# Patient Record
Sex: MALE | Race: WHITE | HISPANIC OR LATINO | Employment: UNEMPLOYED | URBAN - METROPOLITAN AREA
[De-identification: names, ages, dates, MRNs, and addresses within clinical notes are randomized per-mention and may not be internally consistent; named-entity substitution may affect disease eponyms.]

---

## 2019-05-23 ENCOUNTER — TELEPHONE (OUTPATIENT)
Dept: NEUROLOGY | Facility: CLINIC | Age: 69
End: 2019-05-23

## 2022-05-05 ENCOUNTER — OFFICE VISIT (OUTPATIENT)
Dept: UROLOGY | Facility: CLINIC | Age: 72
End: 2022-05-05
Payer: MEDICARE

## 2022-05-05 VITALS
WEIGHT: 192.6 LBS | HEIGHT: 69 IN | SYSTOLIC BLOOD PRESSURE: 118 MMHG | DIASTOLIC BLOOD PRESSURE: 68 MMHG | HEART RATE: 66 BPM | OXYGEN SATURATION: 100 % | BODY MASS INDEX: 28.53 KG/M2

## 2022-05-05 DIAGNOSIS — N40.0 BENIGN PROSTATIC HYPERPLASIA, UNSPECIFIED WHETHER LOWER URINARY TRACT SYMPTOMS PRESENT: Primary | ICD-10-CM

## 2022-05-05 DIAGNOSIS — R97.20 ELEVATED PSA: ICD-10-CM

## 2022-05-05 LAB
POST-VOID RESIDUAL VOLUME, ML POC: 37 ML
SL AMB  POCT GLUCOSE, UA: NORMAL
SL AMB LEUKOCYTE ESTERASE,UA: NORMAL
SL AMB POCT BILIRUBIN,UA: NORMAL
SL AMB POCT BLOOD,UA: NORMAL
SL AMB POCT CLARITY,UA: CLEAR
SL AMB POCT COLOR,UA: YELLOW
SL AMB POCT KETONES,UA: NORMAL
SL AMB POCT NITRITE,UA: NORMAL
SL AMB POCT PH,UA: 6
SL AMB POCT SPECIFIC GRAVITY,UA: 1.02
SL AMB POCT URINE PROTEIN: NORMAL
SL AMB POCT UROBILINOGEN: 0.2

## 2022-05-05 PROCEDURE — 81002 URINALYSIS NONAUTO W/O SCOPE: CPT | Performed by: PHYSICIAN ASSISTANT

## 2022-05-05 PROCEDURE — 99203 OFFICE O/P NEW LOW 30 MIN: CPT | Performed by: PHYSICIAN ASSISTANT

## 2022-05-05 PROCEDURE — 51798 US URINE CAPACITY MEASURE: CPT | Performed by: PHYSICIAN ASSISTANT

## 2022-05-05 RX ORDER — TAMSULOSIN HYDROCHLORIDE 0.4 MG/1
CAPSULE ORAL
COMMUNITY
Start: 2022-04-19 | End: 2022-06-09 | Stop reason: SDUPTHER

## 2022-05-05 RX ORDER — PANTOPRAZOLE SODIUM 40 MG/1
40 TABLET, DELAYED RELEASE ORAL 2 TIMES DAILY
COMMUNITY
Start: 2022-04-19 | End: 2022-05-19 | Stop reason: SDUPTHER

## 2022-05-05 RX ORDER — ALBUTEROL SULFATE 90 UG/1
AEROSOL, METERED RESPIRATORY (INHALATION)
COMMUNITY
Start: 2022-05-04

## 2022-05-05 RX ORDER — ATORVASTATIN CALCIUM 20 MG/1
TABLET, FILM COATED ORAL
COMMUNITY
Start: 2022-05-04

## 2022-05-05 RX ORDER — PHENYTOIN SODIUM 100 MG/1
300 CAPSULE, EXTENDED RELEASE ORAL 2 TIMES DAILY
COMMUNITY
Start: 2022-04-25

## 2022-05-05 RX ORDER — PHENOBARBITAL 32.4 MG/1
64.8 TABLET ORAL 2 TIMES DAILY
COMMUNITY
Start: 2022-04-22 | End: 2022-10-19

## 2022-05-05 RX ORDER — FERROUS SULFATE 325(65) MG
1 TABLET ORAL
COMMUNITY
Start: 2022-04-19

## 2022-05-05 RX ORDER — CLOPIDOGREL BISULFATE 75 MG/1
75 TABLET ORAL DAILY
COMMUNITY
Start: 2022-02-24

## 2022-05-05 RX ORDER — DILTIAZEM HYDROCHLORIDE 60 MG/1
2 TABLET, FILM COATED ORAL 2 TIMES DAILY
COMMUNITY
Start: 2022-03-18

## 2022-05-19 ENCOUNTER — TELEPHONE (OUTPATIENT)
Dept: UROLOGY | Facility: CLINIC | Age: 72
End: 2022-05-19

## 2022-05-19 ENCOUNTER — PATIENT MESSAGE (OUTPATIENT)
Dept: GASTROENTEROLOGY | Facility: CLINIC | Age: 72
End: 2022-05-19

## 2022-05-19 ENCOUNTER — OFFICE VISIT (OUTPATIENT)
Dept: GASTROENTEROLOGY | Facility: CLINIC | Age: 72
End: 2022-05-19
Payer: MEDICARE

## 2022-05-19 ENCOUNTER — TELEPHONE (OUTPATIENT)
Dept: GASTROENTEROLOGY | Facility: CLINIC | Age: 72
End: 2022-05-19

## 2022-05-19 ENCOUNTER — APPOINTMENT (OUTPATIENT)
Dept: LAB | Facility: CLINIC | Age: 72
End: 2022-05-19
Payer: MEDICARE

## 2022-05-19 VITALS
SYSTOLIC BLOOD PRESSURE: 120 MMHG | BODY MASS INDEX: 27.99 KG/M2 | HEART RATE: 76 BPM | OXYGEN SATURATION: 98 % | WEIGHT: 189 LBS | DIASTOLIC BLOOD PRESSURE: 60 MMHG | HEIGHT: 69 IN

## 2022-05-19 DIAGNOSIS — A02.0 SALMONELLA GASTROENTERITIS: ICD-10-CM

## 2022-05-19 DIAGNOSIS — Z86.010 HISTORY OF COLON POLYPS: ICD-10-CM

## 2022-05-19 DIAGNOSIS — K25.4 GASTRIC ULCER WITH HEMORRHAGE, UNSPECIFIED CHRONICITY: Primary | ICD-10-CM

## 2022-05-19 DIAGNOSIS — R97.20 ELEVATED PSA: ICD-10-CM

## 2022-05-19 DIAGNOSIS — R97.20 ELEVATED PSA: Primary | ICD-10-CM

## 2022-05-19 DIAGNOSIS — Z11.59 ENCOUNTER FOR SCREENING FOR OTHER VIRAL DISEASES: ICD-10-CM

## 2022-05-19 DIAGNOSIS — R74.8 ELEVATED ALKALINE PHOSPHATASE LEVEL: ICD-10-CM

## 2022-05-19 LAB — PSA SERPL-MCNC: 4.8 NG/ML (ref 0–4)

## 2022-05-19 PROCEDURE — 36415 COLL VENOUS BLD VENIPUNCTURE: CPT

## 2022-05-19 PROCEDURE — 99204 OFFICE O/P NEW MOD 45 MIN: CPT | Performed by: PHYSICIAN ASSISTANT

## 2022-05-19 PROCEDURE — 84153 ASSAY OF PSA TOTAL: CPT

## 2022-05-19 RX ORDER — PANTOPRAZOLE SODIUM 40 MG/1
40 TABLET, DELAYED RELEASE ORAL 2 TIMES DAILY
Qty: 120 TABLET | Refills: 1 | Status: SHIPPED | OUTPATIENT
Start: 2022-05-19 | End: 2022-07-18

## 2022-05-19 NOTE — TELEPHONE ENCOUNTER
Called and spoke to Florence, patient's wife  Informed urine and BMP was placed into patient's chart and can go to any [facility]'s lab to have that done prior to the MRI  Patient's wife, Florence, was given central scheduling phone number to call and schedule the MRI  Information we will monitor for results and give a call once final or schedule appointment  Florence was thankful for call and verbalized understanding

## 2022-05-19 NOTE — TELEPHONE ENCOUNTER
----- Message from [id number] Donna Gu sent at 5/19/2022  1:56 PM EDT -----  PSA now 4 8, previously 4 67  Would recommend the patient proceed with additional testing such as prostate biopsy or multiparametric MRI of prostate as discussed at last office visit with Mary Giles

## 2022-05-19 NOTE — TELEPHONE ENCOUNTER
----- Message from Wade Wagner sent at 5/19/2022  2:31 PM EDT -----  Regarding: Liver enzymes   Hi   When we were there today I forgot to mention his alkaline phosphatase levels have been high and we were told to address that as well thank you

## 2022-05-19 NOTE — PROGRESS NOTES
[facility] - Outpatient Consultation  Myrtle Flores 71 y o  male MRN: [id number]  Encounter: [id number]          ASSESSMENT AND PLAN:      1  Gastric ulcer with hemorrhage  2  History of colon polyps  3  Salmonella gastroenteritis      Patient had a UGIB/hematemesis in April  He underwent an [facility] which showed a gastric ulcer, gastritis, gastric AVM, and irregular z-line  No biopsies were taken  His HGB dropped to 7 during his hospitalization  He is on Pantoprazole BID  He was taking frequent NSAIDs prior to the EGD  He also is a smoker and just quit a week ago  He then was diagnosed earlier this month with salmonella gastroenteritis and bacteremia at [facility]  He was on Ceftriaxone IV and recommended to go home with a PICC line/IV Ceftriaxone but refused and is on Bactrim po  Latest HGB had improved back up to 11 1  He also reports his last colonoscopy was 7 years ago and he has a history of multiple polyps and is overdue for follow up  Will plan for repeat EGD in 12 weeks (mid July) to assess for ulcer healing and biopsy for h pylori  Continue Pantoprazole 40mg po BID until the EGD  No NSAIDs  Smoking cessation  Will plan for colonoscopy at the same time as the EGD  ID follow up regarding his salmonella bacteremia  Note: He is on Plavix for a femoral artery stent placed a couple of years ago and will hold the Plavix 5 days prior to the procedure (he has held the Plavix for other procedures without problems in the past)  ______________________________________________________________________    HPI:  Patient is a pleasant 71year old male with a PMH of lung cancer, COPD, PAD who presents to the office for follow up from his hospitalization for a UGIB  Patient had a UGIB/hematemesis in April  He underwent an EGD 4/18 through [facility]- which showed a gastric ulcer, gastritis, gastric AVM, and irregular z-line  No biopsies were taken    His hemoglobin dropped to 7 during the hospitalization  He is on Pantoprazole BID  He was taking frequent NSAIDs prior to the EGD  He also is a smoker and just quit a week ago  He then was diagnosed earlier this month with salmonella gastroenteritis and bacteremia at [facility]  He was on Ceftriaxone IV and recommended to go home with a PICC line/IV Ceftriaxone but refused and is on Bactrim po  Latest hemogobin had improved back up to 11 1  He also reports his last colonoscopy was 7 years ago and he has a history of multiple polyps and is overdue for follow up  He reports no further hematemesis  No melena or rectal bleeding  He reports his stools are loose but the frequency has significantly decreased  No further fevers  Abdominal pain has improved  No family history of colon cancer  He is on Plavix for a femoral artery stent placed a couple of years (he reports he has held the Plavix for other procedures without problems in the past)  REVIEW OF SYSTEMS:    CONSTITUTIONAL: Denies any fever, chills, rigors  HEENT: No earache or tinnitus  Denies hearing loss or visual disturbances  CARDIOVASCULAR: No chest pain or palpitations  RESPIRATORY: Denies any cough, hemoptysis, shortness of breath or dyspnea on exertion  GASTROINTESTINAL: As noted in the History of Present Illness  GENITOURINARY: + problems with urination  NEUROLOGIC: No dizziness or vertigo, denies headaches  MUSCULOSKELETAL: Denies any muscle or joint pain  SKIN: Denies skin rashes or itching  ENDOCRINE: Denies excessive thirst  Denies intolerance to heat or cold  PSYCHOSOCIAL: Denies depression or anxiety  Denies any recent memory loss         Historical Information   Past Medical History:   Diagnosis Date    Benign prostatic hyperplasia     COPD [facility]chronic obstructive pulmonary disease) [facility])     Elevated PSA     Gastric ulcer     GERD (gastroesophageal reflux disease)     GERD (gastroesophageal reflux disease)     Lung cancer ([facility])  Migraine     Seizure disorder (HCC)     Seizure disorder (HCC)      Past Surgical History:   Procedure Laterality Date    CATARACT EXTRACTION, BILATERAL      FEMORAL ARTERY STENT       Social History   Social History     Substance and Sexual Activity   Alcohol Use Not Currently     Social History     Substance and Sexual Activity   Drug Use Not Currently     Social History     Tobacco Use   Smoking Status Former Smoker    Quit date: 2022    Years since quittin 0   Smokeless Tobacco Never Used     Family History   Problem Relation Age of Onset    Cancer Father         head/neck    Prostate cancer Father     Prostate cancer Brother     No Known Problems Sister     No Known Problems Sister     No Known Problems Sister     No Known Problems Son     No Known Problems Son     No Known Problems Daughter        Meds/Allergies       Current Outpatient Medications:     albuterol (PROVENTIL HFA,VENTOLIN HFA) 90 mcg/act inhaler    atorvastatin (LIPITOR) 20 mg tablet    clopidogrel (PLAVIX) 75 mg tablet    ferrous sulfate 325 (65 Fe) mg tablet    pantoprazole (PROTONIX) 40 mg tablet    PHENobarbital 32 4 mg tablet    phenytoin (DILANTIN) 100 mg ER capsule    Symbicort 80-4 5 MCG/ACT inhaler    tamsulosin (FLOMAX) 0 4 mg    No Known Allergies        Objective     Blood pressure 120/60, pulse 76, height 5' 9" (1 753 m), weight 85 7 kg (189 lb), SpO2 98 %  Body mass index is 27 91 kg/m²  PHYSICAL EXAM:      General Appearance:   Alert, cooperative, no distress   HEENT:   Normocephalic, atraumatic, anicteric      Neck:  Supple, symmetrical, trachea midline   Lungs:   Clear to auscultation bilaterally; no rales, rhonchi or wheezing; respirations unlabored    Heart[de-identified]   Regular rate and rhythm; no murmur, rub, or gallop     Abdomen:   Soft, non-tender, non-distended; normal bowel sounds; no masses, no organomegaly    Genitalia:   Deferred    Rectal:   Deferred    Extremities:  No cyanosis, clubbing or edema    Pulses:  2+ and symmetric    Skin:  No jaundice, rashes, or lesions    Lymph nodes:  No palpable cervical lymphadenopathy        Lab Results:   No visits with results within 1 Day(s) from this visit  Latest known visit with results is:   Office Visit on 05/05/2022   Component Date Value    POST-VOID RESIDUAL VOLUM* 05/05/2022 37     LEUKOCYTE ESTERASE,UA 05/05/2022 -     NITRITE,UA 05/05/2022 -     SL AMB POCT UROBILINOGEN 05/05/2022 0 2     POCT URINE PROTEIN 05/05/2022 -      PH,UA 05/05/2022 6 0     BLOOD,UA 05/05/2022 -     SPECIFIC GRAVITY,UA 05/05/2022 1 020     KETONES,UA 05/05/2022 -     BILIRUBIN,UA 05/05/2022 -     GLUCOSE, UA 05/05/2022 -      COLOR,UA 05/05/2022 yellow     CLARITY,UA 05/05/2022 clear          Radiology Results:   No results found

## 2022-05-19 NOTE — TELEPHONE ENCOUNTER
Spoke with Jojo Giles- she and patient would like to proceed with the multiparametric MRI of prostate  Please place order  Once order is placed Jojo Giles aware office will reach back out to her

## 2022-05-19 NOTE — TELEPHONE ENCOUNTER
Reviewed chart - he has a mildly elevated isolated alk phos  There are numerous different causes of an elevated alk phos  Will check further labs (alk phos isoenzymes, GGT, AMA, etc) and RUQ ultrasound to assess the liver and biliary tree  Please inform patient

## 2022-05-19 NOTE — PATIENT INSTRUCTIONS
Scheduled date of EGD/colonoscopy (as of today): 7/13  Physician performing EGD/colonoscopy:OSVALDO   Location of EGD/colonoscopy: [facility]  Desired bowel prep reviewed with patient: BRETT MELENDEZ [facility] [place]  Instructions reviewed with patient by:ELBERT  Clearances:

## 2022-05-26 ENCOUNTER — HOSPITAL ENCOUNTER (OUTPATIENT)
Dept: ULTRASOUND IMAGING | Facility: CLINIC | Age: 72
Discharge: HOME | End: 2022-05-26
Payer: MEDICARE

## 2022-05-26 ENCOUNTER — LAB (OUTPATIENT)
Dept: LAB | Facility: CLINIC | Age: 72
End: 2022-05-26
Payer: MEDICARE

## 2022-05-26 DIAGNOSIS — R74.8 ELEVATED ALKALINE PHOSPHATASE LEVEL: ICD-10-CM

## 2022-05-26 DIAGNOSIS — R97.20 ELEVATED PSA: ICD-10-CM

## 2022-05-26 DIAGNOSIS — Z11.59 ENCOUNTER FOR SCREENING FOR OTHER VIRAL DISEASES: ICD-10-CM

## 2022-05-26 LAB
ANION GAP SERPL CALCULATED.3IONS-SCNC: 8 MMOL/L (ref 4–13)
BUN SERPL-MCNC: 15 MG/DL (ref 5–25)
CALCIUM SERPL-MCNC: 9.5 MG/DL (ref 8.3–10.1)
CHLORIDE SERPL-SCNC: 111 MMOL/L (ref 100–108)
CO2 SERPL-SCNC: 22 MMOL/L (ref 21–32)
CREAT SERPL-MCNC: 0.9 MG/DL (ref 0.6–1.3)
GFR SERPL CREATININE-BSD FRML MDRD: 85 ML/MIN/1.73SQ M
GGT SERPL-CCNC: 223 U/L (ref 5–85)
GLUCOSE P FAST SERPL-MCNC: 93 MG/DL (ref 65–99)
HBV CORE IGM SER QL: NORMAL
HBV SURFACE AG SER QL: NORMAL
HCV AB SER QL: NORMAL
POTASSIUM SERPL-SCNC: 4.3 MMOL/L (ref 3.5–5.3)
SODIUM SERPL-SCNC: 141 MMOL/L (ref 136–145)

## 2022-05-26 PROCEDURE — 84080 ASSAY ALKALINE PHOSPHATASES: CPT

## 2022-05-26 PROCEDURE — 87340 HEPATITIS B SURFACE AG IA: CPT

## 2022-05-26 PROCEDURE — 80048 BASIC METABOLIC PNL TOTAL CA: CPT

## 2022-05-26 PROCEDURE — 82977 ASSAY OF GGT: CPT

## 2022-05-26 PROCEDURE — 86705 HEP B CORE ANTIBODY IGM: CPT

## 2022-05-26 PROCEDURE — 84075 ASSAY ALKALINE PHOSPHATASE: CPT

## 2022-05-26 PROCEDURE — 86803 HEPATITIS C AB TEST: CPT

## 2022-05-26 PROCEDURE — 86381 MITOCHONDRIAL ANTIBODY EACH: CPT

## 2022-05-26 PROCEDURE — 36415 COLL VENOUS BLD VENIPUNCTURE: CPT

## 2022-05-26 PROCEDURE — 76705 ECHO EXAM OF ABDOMEN: CPT

## 2022-05-27 LAB — MITOCHONDRIA M2 IGG SER-ACNC: <20 UNITS (ref 0–20)

## 2022-05-28 LAB
ALP BONE CFR SERPL: 20 % (ref 12–68)
ALP INTEST CFR SERPL: 4 % (ref 0–18)
ALP LIVER CFR SERPL: 76 % (ref 13–88)
ALP SERPL-CCNC: 127 IU/L (ref 44–121)

## 2022-06-07 ENCOUNTER — TELEPHONE (OUTPATIENT)
Dept: GASTROENTEROLOGY | Facility: CLINIC | Age: 72
End: 2022-06-07

## 2022-06-07 DIAGNOSIS — R74.8 ELEVATED ALKALINE PHOSPHATASE LEVEL: Primary | ICD-10-CM

## 2022-06-09 ENCOUNTER — PATIENT MESSAGE (OUTPATIENT)
Dept: UROLOGY | Facility: CLINIC | Age: 72
End: 2022-06-09

## 2022-06-09 DIAGNOSIS — N40.0 BENIGN PROSTATIC HYPERPLASIA, UNSPECIFIED WHETHER LOWER URINARY TRACT SYMPTOMS PRESENT: Primary | ICD-10-CM

## 2022-06-10 RX ORDER — TAMSULOSIN HYDROCHLORIDE 0.4 MG/1
0.4 CAPSULE ORAL
Qty: 90 CAPSULE | Refills: 2 | Status: SHIPPED | OUTPATIENT
Start: 2022-06-10

## 2022-07-01 ENCOUNTER — HOSPITAL ENCOUNTER (OUTPATIENT)
Dept: RADIOLOGY | Age: 72
Discharge: HOME | End: 2022-07-01
Payer: MEDICARE

## 2022-07-01 DIAGNOSIS — R97.20 ELEVATED PSA: ICD-10-CM

## 2022-07-01 PROCEDURE — G1004 CDSM NDSC: HCPCS

## 2022-07-01 PROCEDURE — A9585 GADOBUTROL INJECTION: HCPCS | Performed by: PHYSICIAN ASSISTANT

## 2022-07-01 PROCEDURE — 76377 3D RENDER W/INTRP POSTPROCES: CPT

## 2022-07-01 PROCEDURE — 72197 MRI PELVIS W/O & W/DYE: CPT

## 2022-07-01 RX ADMIN — GADOBUTROL 8 ML: 604.72 INJECTION INTRAVENOUS at 12:29

## 2022-07-06 NOTE — RESULT ENCOUNTER NOTE
Patient was made aware and verbalized understanding of getting his psa done prior to his appointment

## 2023-04-06 DIAGNOSIS — N40.0 BENIGN PROSTATIC HYPERPLASIA, UNSPECIFIED WHETHER LOWER URINARY TRACT SYMPTOMS PRESENT: ICD-10-CM

## 2023-04-06 RX ORDER — TAMSULOSIN HYDROCHLORIDE 0.4 MG/1
CAPSULE ORAL
Qty: 90 CAPSULE | Refills: 2 | Status: SHIPPED | OUTPATIENT
Start: 2023-04-06